# Patient Record
Sex: MALE | Race: WHITE | NOT HISPANIC OR LATINO | Employment: FULL TIME | URBAN - METROPOLITAN AREA
[De-identification: names, ages, dates, MRNs, and addresses within clinical notes are randomized per-mention and may not be internally consistent; named-entity substitution may affect disease eponyms.]

---

## 2024-08-22 ENCOUNTER — OFFICE VISIT (OUTPATIENT)
Dept: NEUROLOGY | Facility: CLINIC | Age: 64
End: 2024-08-22
Payer: COMMERCIAL

## 2024-08-22 VITALS
SYSTOLIC BLOOD PRESSURE: 126 MMHG | OXYGEN SATURATION: 98 % | BODY MASS INDEX: 24.14 KG/M2 | HEART RATE: 76 BPM | HEIGHT: 69 IN | DIASTOLIC BLOOD PRESSURE: 74 MMHG | WEIGHT: 163 LBS

## 2024-08-22 DIAGNOSIS — R20.0 LEFT FACIAL NUMBNESS: Primary | ICD-10-CM

## 2024-08-22 DIAGNOSIS — E53.8 VITAMIN B12 DEFICIENCY: ICD-10-CM

## 2024-08-22 PROBLEM — D63.8 ANEMIA OF CHRONIC DISEASE: Status: ACTIVE | Noted: 2024-08-16

## 2024-08-22 PROBLEM — R73.9 HYPERGLYCEMIA: Status: ACTIVE | Noted: 2021-05-24

## 2024-08-22 PROBLEM — E11.9 TYPE 2 DIABETES MELLITUS: Status: ACTIVE | Noted: 2024-08-06

## 2024-08-22 PROBLEM — I24.9 ACS (ACUTE CORONARY SYNDROME): Status: ACTIVE | Noted: 2024-08-06

## 2024-08-22 PROBLEM — Z76.89 PERSONS ENCOUNTERING HEALTH SERVICES IN OTHER SPECIFIED CIRCUMSTANCES: Status: ACTIVE | Noted: 2021-05-24

## 2024-08-22 PROBLEM — I10 HTN (HYPERTENSION): Status: ACTIVE | Noted: 2024-08-06

## 2024-08-22 PROBLEM — E78.2 MIXED HYPERLIPIDEMIA: Status: ACTIVE | Noted: 2021-05-24

## 2024-08-22 PROBLEM — I10 BENIGN ESSENTIAL HYPERTENSION: Status: ACTIVE | Noted: 2024-08-16

## 2024-08-22 PROBLEM — K21.9 GASTROESOPHAGEAL REFLUX DISEASE: Status: ACTIVE | Noted: 2024-02-09

## 2024-08-22 PROBLEM — I25.2 OLD MI (MYOCARDIAL INFARCTION): Status: ACTIVE | Noted: 2024-08-08

## 2024-08-22 PROBLEM — M54.12 CERVICAL RADICULOPATHY: Status: ACTIVE | Noted: 2024-08-16

## 2024-08-22 PROBLEM — I25.10 CORONARY ATHEROSCLEROSIS: Status: ACTIVE | Noted: 2024-08-16

## 2024-08-22 PROBLEM — Z95.5 S/P DRUG ELUTING CORONARY STENT PLACEMENT: Status: ACTIVE | Noted: 2024-08-08

## 2024-08-22 RX ORDER — LOSARTAN POTASSIUM 50 MG/1
50 TABLET ORAL DAILY
COMMUNITY
Start: 2024-08-09

## 2024-08-22 RX ORDER — PIOGLITAZONEHYDROCHLORIDE 15 MG/1
15 TABLET ORAL DAILY
COMMUNITY
Start: 2024-07-09

## 2024-08-22 RX ORDER — AMLODIPINE BESYLATE 2.5 MG/1
2.5 TABLET ORAL DAILY
COMMUNITY
Start: 2024-08-10 | End: 2024-11-08

## 2024-08-22 RX ORDER — GLIMEPIRIDE 2 MG/1
1 TABLET ORAL 2 TIMES DAILY
COMMUNITY
Start: 2024-05-01

## 2024-08-22 RX ORDER — ATORVASTATIN CALCIUM 40 MG/1
40 TABLET, FILM COATED ORAL DAILY
COMMUNITY
Start: 2024-08-09

## 2024-08-22 RX ORDER — ACETAMINOPHEN 325 MG/1
TABLET ORAL
COMMUNITY

## 2024-08-22 RX ORDER — ISOSORBIDE MONONITRATE 30 MG/1
30 TABLET, EXTENDED RELEASE ORAL DAILY
COMMUNITY
Start: 2024-08-09

## 2024-08-22 RX ORDER — ASPIRIN 81 MG/1
81 TABLET ORAL DAILY
COMMUNITY
Start: 2024-08-09

## 2024-08-22 NOTE — PROGRESS NOTES
[facility] NEUROLOGY         Date of Visit: 2024    Name: Aldair Green    :  1960    PCP: Dominguez Brooke MD    Visit Type: an initial evaluation         Subjective     Patient ID: Aldair is a 64 y.o. male.         History of Present Illness  I had the pleasure of seeing your patient for the first time today.  As you may know he is a 64-year-old male here today for initial evaluation for complaints of left-sided facial numbness along with the left arm numbness and tingling.  He was referred by neurosurgery.    History:    Patient does have history of hypertension, hyperlipidemia, diabetes mellitus type 2, recent MI with cardiac stent placements, cervical spinal stenosis with previous cervical spinal fusion and continued degenerative disease including foraminal and spinal canal stenosis.      Patient states that over the last 6 months he has been having intermittent symptoms of left-sided facial paresthesias and paresthesias down the left side of the neck into the left arm coming approximately to the mid portion of the upper portion of the left arm.  Patient states that symptoms are described as a tingling like sensation such as when your foot falls asleep or a feeling of something crawling on his skin.  He states symptoms are not particularly painful but very annoying.  Symptoms come and go lasting anywhere from 5 seconds to 4 to 5 minutes in duration but can occur multiple times per day.  Patient states initially symptoms started in the upper portion of his face only however have progressed slightly.  He did end up seeing neurosurgery for evaluation thinking this was possibly related to some neck issues due to his previous cervical spinal problems.      Did end up completing an MRI brain and MRI cervical spine with neurosurgery both without contrast.  MRI of the brain did not show any significant abnormalities.  MRI of the cervical spine does show multiple levels of degenerative changes  with both moderate to severe foraminal narrowing and's moderate to severe spinal canal stenosis however no signs of myelopathy noted on examination with no cord abnormalities.  Neurosurgery did not feel that the findings on MRI explain patient's facial symptoms.  They did encourage him to come for follow-up with us.    Patient has had recent lab work done with his primary care physician.  Recent B12 level was found to be in the 400s.  He is not currently on B12 replacement.  Patient's A1c was found to be 7.8.  Thyroid levels are normal.  No inflammatory markers have been elevated.    Current:    Patient states that symptoms have become more frequent since his most recent heart attack and stent placement.  Patient has never had any imaging of the arteries of the head or neck.  He denies any symptoms such as weakness, speech issues, vision changes with the symptoms.  He does report chronic daily headache which she has had for over 20 years with no significant worsening of headache symptoms or new symptoms associated with the headache.  He denies any numbness of the face.  He denies any severe pain symptoms.  He has not taken any medication for symptoms.  He does not notice any particular triggers for symptoms.  No relieving factors.  Symptoms can, anytime in the day even in the middle of the night.  No other new neurological complaints at today's visit.      Left side of face now down into arm and neck.       The following portions of the patient's history were reviewed and updated as appropriate: allergies, current medications, past family history, past medical history, past social history, past surgical history, and problem list.                 Review of Systems   Constitutional:  Negative for activity change, appetite change, fatigue and unexpected weight change.   HENT:  Negative for hearing loss, tinnitus and trouble swallowing.    Eyes:  Negative for photophobia, pain and visual disturbance.   Respiratory:   "Negative for chest tightness and shortness of breath.    Cardiovascular:  Negative for palpitations.   Gastrointestinal:  Negative for constipation, diarrhea, nausea and vomiting.   Genitourinary:  Negative for difficulty urinating, frequency and urgency.   Musculoskeletal:  Negative for back pain, gait problem and neck pain.   Neurological:  Positive for numbness. Negative for dizziness, syncope, facial asymmetry, speech difficulty, weakness, light-headedness and headaches.   Psychiatric/Behavioral:  Negative for confusion and sleep disturbance.             Current Medications:    Current Outpatient Medications   Medication Instructions    acetaminophen (TYLENOL) 325 MG tablet Oral    amLODIPine (NORVASC) 2.5 mg, Oral, Daily    aspirin 81 mg, Oral, Daily    atorvastatin (LIPITOR) 40 mg, Oral, Daily    Cyanocobalamin 1,000 mcg, Sublingual, Daily    esomeprazole (NEXIUM) 20 mg, Oral, Daily    glimepiride (AMARYL) 2 MG tablet 1 tablet, Oral, 2 Times Daily    isosorbide mononitrate (IMDUR) 30 mg, Oral, Daily    losartan (COZAAR) 50 mg, Oral, Daily    pioglitazone (ACTOS) 15 mg, Oral, Daily    ticagrelor (BRILINTA) 90 mg, Oral          /74   Pulse 76   Ht 175.3 cm (69\")   Wt 73.9 kg (163 lb)   SpO2 98%   BMI 24.07 kg/m²                Objective     Neurological Exam  Mental Status  Awake, alert and oriented to person, place and time. Speech is normal. Language is fluent with no aphasia.    Cranial Nerves  CN II: Visual fields full to confrontation.  CN III, IV, VI: Extraocular movements intact bilaterally. Normal lids and orbits bilaterally. Pupils equal round and reactive to light bilaterally.  CN V: Facial sensation is normal.  CN VII: Full and symmetric facial movement.  CN IX, X: Palate elevates symmetrically  CN XI: Shoulder shrug strength is normal.  CN XII: Tongue midline without atrophy or fasciculations.    Motor  Normal muscle bulk throughout. Normal muscle tone. No abnormal involuntary movements. " Strength is 5/5 throughout all four extremities.    Sensory  Sensation is intact to light touch, pinprick, vibration and proprioception in all four extremities.    Reflexes  Deep tendon reflexes are 2+ and symmetric in all four extremities.    Coordination    Finger-to-nose, rapid alternating movements and heel-to-shin normal bilaterally without dysmetria.    Gait  Normal casual, toe, heel and tandem gait.      Physical Exam  Constitutional:       Appearance: Normal appearance. He is normal weight.   HENT:      Head: Normocephalic.   Eyes:      General: Lids are normal.      Extraocular Movements: Extraocular movements intact.      Pupils: Pupils are equal, round, and reactive to light.   Pulmonary:      Effort: Pulmonary effort is normal.   Musculoskeletal:         General: Normal range of motion.   Skin:     General: Skin is warm.   Neurological:      Motor: Motor strength is normal.     Coordination: Coordination is intact.      Deep Tendon Reflexes: Reflexes are normal and symmetric.   Psychiatric:         Mood and Affect: Mood normal.         Speech: Speech normal.         Behavior: Behavior normal.         Thought Content: Thought content normal.                     Assessment & Plan     Diagnoses and all orders for this visit:    1. Left facial numbness (Primary)  -     MRI Angiogram Head Without Contrast; Future  -     MRI Angiogram Neck Without Contrast; Future  -     MRI Brain With & Without Contrast; Future    2. Vitamin B12 deficiency  -     Cyanocobalamin 1000 MCG sublingual tablet; Place 1,000 mcg under the tongue Daily for 90 days.  Dispense: 30 each; Refill: 2       At this time I would like to obtain MRI brain with special attention to the left trigeminal nerve to look for possible trigeminal neuralgia.    I would also like to obtain an MR a of the head and neck to rule out any vascular cause for patient's symptoms that could be prestrokelike symptoms.    In addition we will go ahead and treat B12  deficiency as this could be a cause for intermittent paresthesias.    Follow-up after testing is complete.            Melly SENIOR    Neurology    [facility] [place]    Phone: [phone]    8/22/2024 , 13:37 EDT

## 2024-09-27 DIAGNOSIS — R20.0 LEFT FACIAL NUMBNESS: ICD-10-CM

## 2024-09-27 DIAGNOSIS — I65.21 STENOSIS OF RIGHT CAROTID ARTERY GREATER THAN 50%: Primary | ICD-10-CM

## 2024-09-27 DIAGNOSIS — I65.01 STENOSIS OF RIGHT VERTEBRAL ARTERY: ICD-10-CM

## 2024-09-27 NOTE — PROGRESS NOTES
Can we see if the MRI brain also came across?  I see the MRA head and MRA neck but not the brain MRI

## 2024-10-09 ENCOUNTER — TELEPHONE (OUTPATIENT)
Dept: NEUROLOGY | Facility: CLINIC | Age: 64
End: 2024-10-09
Payer: COMMERCIAL

## 2024-10-09 NOTE — PROGRESS NOTES
Subjective   Patient ID: Aldair Green is a 64 y.o. male is being seen for consultation today at the request of NADEEN Lee  - Stenosis of right carotid artery greater than 50%  - Stenosis of right vertebral artery   MRI angiogram head/neck w/o contrast completed on 09/27/2024.    MRI brain w w/o contrast completed on 09/27/2024.        History of Present Illness  Aldair is a very pleasant 64-year-old male who presents to my office today with findings from an MRI of the head and neck and an MRI of the brain.  The patient reports that several months ago he saw an interventional cardiologist and had several stents placed.  At this time several of his blood pressure medications were changed and since then he has been having dizzy spells.  He also describes an intermittent left facial tingling sensation.  He underwent these MRIs which demonstrated a diminutive right vertebral artery and dominant left vertebral artery, and moderate stenosis of the right internal carotid artery proximally.  The patient denies any episodes of acute or intermittent left-sided weakness beyond his normal weakness from his previous cervical myelopathy.  He has not had any episodes of sudden vision loss on the right.  He takes his medications as scheduled.     The patient reports concerns about these episodes of dizziness.  He says that if he turns his head or leans down or stands up too quickly that he suddenly becomes very lightheaded.  This is affected his ability to do his job and now he has a different position at his work.  He feels that this is affecting his life daily.  He does attribute this to the time when his medications were changed several months ago.  He said that he has been worked up by otolaryngology for vertigo and that was negative.     He describes intermittent episodes of facial tingling in the V1 V2 and V3 distribution on the left side that are intermittent and happen several times a week.  These are not associated  "with pain or weakness.      The following portions of the patient's history were reviewed and updated as appropriate: allergies, current medications, past family history, past medical history, past social history, past surgical history, and problem list.    Review of Systems   Eyes:  Positive for visual disturbance (left eye blurry vision).   Neurological:  Positive for dizziness, light-headedness and headaches.   Psychiatric/Behavioral:  Positive for confusion. The patient is nervous/anxious.    All other systems reviewed and are negative.      Objective     Vitals:    10/16/24 0801   BP: 146/70   BP Location: Right arm   Patient Position: Sitting   Cuff Size: Adult   Pulse: 70   Resp: 16   Temp: 97.4 °F (36.3 °C)   TempSrc: Temporal   SpO2: 97%   Weight: 84.4 kg (186 lb)   Height: 175.3 cm (69.02\")   PainSc: 0-No pain     Body mass index is 27.45 kg/m².    Lab Results   Component Value Date    WBC 6.2 08/16/2024    HGB 14.3 08/16/2024    HCT 41.1 08/16/2024    MCV 91.0 08/16/2024     08/16/2024     Lab Results   Component Value Date    CALCIUM 9.2 08/16/2024       Physical Exam:    negative findings: R handed, mental status intact, cranial nerves 2-12 intact, gait, including heel, toe, and tandem walking normal, muscle tone normal, muscle strength normal, finger to nose normal, sensation to light touch and pinprick normal, positive Precious's on the left no pronator drift, 1-2 beats of nystagmus with lateral gaze bilaterally    Assessment & Plan       Aldair Green  reports that he has never smoked. He has never used smokeless tobacco.      Independent Review of Radiographic Studies:      I personally reviewed the images from the following studies.    MRI of the brain  with and without contrast performed on 8/22/2024 at [facility]  There is no evidence of acute ischemic events.  There is some minor microvascular changes.  No evidence of previous stroke MRA of the head and neck:  There is 50 to 60% stenosis of " the proximal right internal carotid artery.  Minimal stenosis of the left internal carotid artery proximally.  The patient has a dominant left vertebral artery with diminutive right vertebral artery that has signal dropout Just proximally to the vertebrobasilar junction.  The basilar artery has good flow.    Medical Decision Making:      I spent 60 minutes caring for Aldair on this date of service. This time includes time spent by me in the following activities:preparing for the visit, reviewing tests, obtaining and/or reviewing a separately obtained history, performing a medically appropriate examination and/or evaluation , counseling and educating the patient/family/caregiver, ordering medications, tests, or procedures, documenting information in the medical record, independently interpreting results and communicating that information with the patient/family/caregiver, and care coordination.          Assessment & Plan  Carotid stenosis, asymptomatic, right  The patient has moderate asymptomatic stenosis of the right internal carotid artery.  I explained the patient that he is at some increased risk for stroke but with dual antiplatelet therapy and statin therapy, his risk for stroke is decreased.  We will get a repeat CTA of the head and neck in 1 year to assess for worsening stenosis.  He will see me back in my clinic after these tests are done  Dizziness  The patient signs and symptoms are not consistent with vertebrobasilar insufficiency.  I believe he just has a diminutive right vertebral artery and a dominant left vertebral artery.  His dizziness episodes are concurrent with the change in medication.  It is unclear whether his isosorbide mononitrate is a new medication but the patient denies chest pain.  I suggested he follow-up with his cardiology physician to discuss the changes to his medication and see if these may be causing his symptoms.            No follow-ups on file.     This note was completed using  Dragon Dict[employer] software.

## 2024-10-16 ENCOUNTER — OFFICE VISIT (OUTPATIENT)
Dept: NEUROSURGERY | Facility: CLINIC | Age: 64
End: 2024-10-16
Payer: COMMERCIAL

## 2024-10-16 VITALS
HEART RATE: 70 BPM | SYSTOLIC BLOOD PRESSURE: 146 MMHG | RESPIRATION RATE: 16 BRPM | BODY MASS INDEX: 27.55 KG/M2 | HEIGHT: 69 IN | OXYGEN SATURATION: 97 % | WEIGHT: 186 LBS | DIASTOLIC BLOOD PRESSURE: 70 MMHG | TEMPERATURE: 97.4 F

## 2024-10-16 DIAGNOSIS — I65.21 CAROTID STENOSIS, ASYMPTOMATIC, RIGHT: Primary | ICD-10-CM

## 2024-10-16 DIAGNOSIS — R42 DIZZINESS: ICD-10-CM

## 2024-10-16 NOTE — ASSESSMENT & PLAN NOTE
The patient signs and symptoms are not consistent with vertebrobasilar insufficiency.  I believe he just has a diminutive right vertebral artery and a dominant left vertebral artery.  His dizziness episodes are concurrent with the change in medication.  It is unclear whether his isosorbide mononitrate is a new medication but the patient denies chest pain.  I suggested he follow-up with his cardiology physician to discuss the changes to his medication and see if these may be causing his symptoms.

## 2024-10-16 NOTE — ASSESSMENT & PLAN NOTE
The patient has moderate asymptomatic stenosis of the right internal carotid artery.  I explained the patient that he is at some increased risk for stroke but with dual antiplatelet therapy and statin therapy, his risk for stroke is decreased.  We will get a repeat CTA of the head and neck in 1 year to assess for worsening stenosis.  He will see me back in my clinic after these tests are done